# Patient Record
Sex: FEMALE | Race: WHITE | URBAN - METROPOLITAN AREA
[De-identification: names, ages, dates, MRNs, and addresses within clinical notes are randomized per-mention and may not be internally consistent; named-entity substitution may affect disease eponyms.]

---

## 2017-01-05 ENCOUNTER — HOSPITAL ENCOUNTER (OUTPATIENT)
Dept: LAB | Facility: CLINIC | Age: 32
Discharge: HOME | End: 2017-01-05
Attending: PSYCHIATRY & NEUROLOGY | Admitting: PSYCHIATRY & NEUROLOGY
Payer: COMMERCIAL

## 2017-01-05 DIAGNOSIS — G40.309 NONINTRACTABLE GENERALIZED IDIOPATHIC EPILEPSY WITHOUT STATUS EPILEPTICUS (H): ICD-10-CM

## 2017-01-05 PROCEDURE — 36415 COLL VENOUS BLD VENIPUNCTURE: CPT | Performed by: PSYCHIATRY & NEUROLOGY

## 2017-01-05 PROCEDURE — 80175 DRUG SCREEN QUAN LAMOTRIGINE: CPT | Performed by: PSYCHIATRY & NEUROLOGY

## 2017-01-07 LAB — LAMOTRIGINE SERPL-MCNC: 7.8 UG/ML

## 2017-02-02 ENCOUNTER — OFFICE VISIT (OUTPATIENT)
Dept: NEUROLOGY | Facility: CLINIC | Age: 32
End: 2017-02-02

## 2017-02-02 VITALS
HEART RATE: 76 BPM | SYSTOLIC BLOOD PRESSURE: 119 MMHG | WEIGHT: 180 LBS | DIASTOLIC BLOOD PRESSURE: 70 MMHG | BODY MASS INDEX: 30.73 KG/M2 | OXYGEN SATURATION: 97 % | HEIGHT: 64 IN | RESPIRATION RATE: 20 BRPM

## 2017-02-02 DIAGNOSIS — G40.309 NONINTRACTABLE GENERALIZED IDIOPATHIC EPILEPSY WITHOUT STATUS EPILEPTICUS (H): Primary | ICD-10-CM

## 2017-02-02 DIAGNOSIS — G40.309 NONINTRACTABLE GENERALIZED IDIOPATHIC EPILEPSY WITHOUT STATUS EPILEPTICUS (H): ICD-10-CM

## 2017-02-02 ASSESSMENT — PAIN SCALES - GENERAL: PAINLEVEL: NO PAIN (0)

## 2017-02-02 NOTE — Clinical Note
2017       RE: Cherelle Cummins  [address] [place] [address]     Dear Colleague,    Thank you for referring your patient, Cherelle Cummins, to the [facility] NEUROLOGY at [facility]. Please see a copy of my visit note below.    2017             [facility]    [address] [place] [address]      RE: Cherelle Cummins   MRN: [id number]   : 1985      Dear Dr. Reynoso:      Once again, we had the privilege of seeing Mrs. Cherelle Cummins, a 31-year-old preemie , who has an EDC on 02/10.  She comes today for a final visit prior to the delivery.      She has done very well in the last few months, having had no seizures.  We have been monitoring her levels of Lamictal, and they have been relatively stable, although the doses had to be progressively increased, so she is taking now 600 mg twice a day.      Back in November she had a low level at 3.8, and then we have been increasing this subsequently.  She has been on folic acid.  Her last EEG on  did show generalized spike and wave, which were occurring at a frequency reported as occurring occasionally during sleep with frontal predominance.      Her last level of lamotrigine was on 2017, and this was 7.8.  The previous ones have been around 8.      She comes in with her .  We reviewed issues, such as breast-feeding.  We recommended that probably it would be good to alternate breast with bottle, something she is not eager to do, but I think they accepted that.  She is wanting to more breast-feed the baby.      She will stay on the folic acid now.  They want her to stay on the Lamictal probably after delivery because they want to have other children.      We went through the history, and she has an aunt who had seizures young in life and then went 20 years without them, and the seizures did recur.  She has a sister who has  had some difficulty with seizures, but they have been stable after a medication change.  She is currently on Keppra, and she had no problem with delivery.  We talked about the care of the baby, precautions on how to handle the baby to reduce the potential of injury to the baby, and we have given information about this in the past as well.      PHYSICAL EXAMINATION:  Blood pressure is 119/70.  Pulse is 76.  She is looking good and not showing any difficulties or abnormalities.      We have ordered a blood level for her today, and she should get one, also, prior to delivery, and we do have an open order for her.      We will see her after the delivery in approximately 3 weeks.  The issue of vitamin K has come up, and I do not think that needs to be administered, but we will review the literature and advise further.      Thank you, and we will be available during delivery, and I gave her my personal cell phone to call.        Best regards,      Radu Reyna MD     D: 2017 17:11   T: 2017 10:32   MT: kanu      Name:     TIMO GONZALEZ   MRN:      [id number]        Account:      [id number]   :      1985           Service Date: 2017      Document: [id number]

## 2017-02-02 NOTE — MR AVS SNAPSHOT
After Visit Summary   2/2/2017    Cherelle Cummins    MRN: [id number]           Patient Information     Date Of Birth          1985        Visit Information        Provider Department      2/2/2017 4:30 PM Radu Reyna MD [facility] Neurology        Today's Diagnoses     Nonintractable generalized idiopathic epilepsy without status epilepticus (H)    -  1        Follow-ups after your visit        Follow-up notes from your care team     Return in about 3 weeks (around 2/23/2017).      Your next 10 appointments already scheduled     Feb 02, 2017  5:30 PM   LAB with  LAB   [facility] Lab ([facility])    [address] [place] [address] [phone]           Patient must bring picture ID.  Patient should be prepared to give a urine specimen  Please do not eat 10-12 hours before your appointment if you are coming in fasting for labs on lipids, cholesterol, or glucose (sugar).  Pregnant women should follow their Care Team instructions. Water with medications is okay. Do not drink coffee or other fluids.   If you have concerns about taking  your medications, please ask at office or if scheduling via [employer], send a message by clicking on Secure Messaging, Message Your Care Team.            Mar 02, 2017  3:30 PM   (Arrive by 3:15 PM)   Return Seizure with Radu Reyna MD   [facility] Neurology ([facility])    [address] [place] [address] [phone]              Future tests that were ordered for you today     Open Standing Orders        Priority Remaining Interval Expires Ordered    Lamotrigine level STAT 5/5  2/2/2018 2/2/2017            Who to contact     Please call your clinic at [phone] to:    Ask questions about your health    Make or cancel appointments    Discuss your medicines    Learn about your test results    Speak to your doctor   If you have compliments or concerns about  "an experience at your clinic, or if you wish to file a complaint, please contact [facility] Patient Relations at [phone] or email us at [email or web address]         Additional Information About Your Visit        [employer] Information     [employer] gives you secure access to your electronic health record. If you see a primary care provider, you can also send messages to your care team and make appointments. If you have questions, please call your primary care clinic.  If you do not have a primary care provider, please call [phone] and they will assist you.      [employer] is an electronic gateway that provides easy, online access to your medical records. With [employer], you can request a clinic appointment, read your test results, renew a prescription or communicate with your care team.     To access your existing account, please contact your [facility] or call [phone] for assistance.        Care EveryWhere ID     This is your Care EveryWhere ID. This could be used by other organizations to access your [place] medical records  [id number]        Your Vitals Were     Pulse Respirations Height BMI (Body Mass Index) Pulse Oximetry Last Period    76 20 1.626 m (5' 4\") 30.88 kg/m2 97% 04/10/2016 (Exact Date)    Breastfeeding?                   No            Blood Pressure from Last 3 Encounters:   02/02/17 119/70   06/24/16 110/72   04/29/16 110/76    Weight from Last 3 Encounters:   02/02/17 81.647 kg (180 lb)   12/19/16 76.567 kg (168 lb 12.8 oz)   06/24/16 71.26 kg (157 lb 1.6 oz)               Primary Care Provider Office Phone # Fax #    Bairon Herrera [phone]       [address] [facility] [address]  [place] [address]        Thank you!     Thank you for choosing [facility] NEUROLOGY  for your care. Our goal is always to provide you with excellent care. Hearing back from our patients is one way we can " continue to improve our services. Please take a few minutes to complete the written survey that you may receive in the mail after your visit with us. Thank you!             Your Updated Medication List - Protect others around you: Learn how to safely use, store and throw away your medicines at www.disposemymeds.org.          This list is accurate as of: 2/2/17  5:19 PM.  Always use your most recent med list.                   Brand Name Dispense Instructions for use    folic acid 1 MG tablet    FOLVITE    30 tablet    Take 1 tablet (1 mg) by mouth daily       lamoTRIgine 200 MG tablet    LaMICtal    180 tablet    3 tabs twice a day       PRENATAL AD PO      Take 1 tablet by mouth daily

## 2017-02-03 LAB — LAMOTRIGINE SERPL-MCNC: 8.1 UG/ML

## 2017-02-03 NOTE — PROGRESS NOTES
2017             MD Letha [facility] [place]    [address] [place] [address]      RE: Cherelle Cummins   MRN: [id number]   : 1985      Dear Dr. Reynoso:      Once again, we had the privilege of seeing Mrs. Cherelle Cummins, a 31-year-old preemie , who has an EDC on 02/10.  She comes today for a final visit prior to the delivery.      She has done very well in the last few months, having had no seizures.  We have been monitoring her levels of Lamictal, and they have been relatively stable, although the doses had to be progressively increased, so she is taking now 600 mg twice a day.      Back in November she had a low level at 3.8, and then we have been increasing this subsequently.  She has been on folic acid.  Her last EEG on  did show generalized spike and wave, which were occurring at a frequency reported as occurring occasionally during sleep with frontal predominance.      Her last level of lamotrigine was on 2017, and this was 7.8.  The previous ones have been around 8.      She comes in with her .  We reviewed issues, such as breast-feeding.  We recommended that probably it would be good to alternate breast with bottle, something she is not eager to do, but I think they accepted that.  She is wanting to more breast-feed the baby.      She will stay on the folic acid now.  They want her to stay on the Lamictal probably after delivery because they want to have other children.      We went through the history, and she has an aunt who had seizures young in life and then went 20 years without them, and the seizures did recur.  She has a sister who has had some difficulty with seizures, but they have been stable after a medication change.  She is currently on Keppra, and she had no problem with delivery.  We talked about the care of the baby, precautions on how to handle the baby to reduce the potential of injury to the  baby, and we have given information about this in the past as well.      PHYSICAL EXAMINATION:  Blood pressure is 119/70.  Pulse is 76.  She is looking good and not showing any difficulties or abnormalities.      We have ordered a blood level for her today, and she should get one, also, prior to delivery, and we do have an open order for her.      We will see her after the delivery in approximately 3 weeks.  The issue of vitamin K has come up, and I do not think that needs to be administered, but we will review the literature and advise further.      Thank you, and we will be available during delivery, and I gave her my personal cell phone to call.        Best regards,      MD CATRACHITO Morales MD             D: 2017 17:11   T: 2017 10:32   MT: kanu      Name:     TIMO GONZALEZ   MRN:      [id number]        Account:      [id number]   :      1985           Service Date: 2017      Document: [id number]

## 2017-03-02 ENCOUNTER — OFFICE VISIT (OUTPATIENT)
Dept: NEUROLOGY | Facility: CLINIC | Age: 32
End: 2017-03-02

## 2017-03-02 VITALS — DIASTOLIC BLOOD PRESSURE: 74 MMHG | HEART RATE: 82 BPM | SYSTOLIC BLOOD PRESSURE: 124 MMHG | HEIGHT: 64 IN

## 2017-03-02 DIAGNOSIS — G40.309 NONINTRACTABLE GENERALIZED IDIOPATHIC EPILEPSY WITHOUT STATUS EPILEPTICUS (H): ICD-10-CM

## 2017-03-02 DIAGNOSIS — G40.309 NONINTRACTABLE GENERALIZED IDIOPATHIC EPILEPSY WITHOUT STATUS EPILEPTICUS (H): Primary | ICD-10-CM

## 2017-03-02 ASSESSMENT — PAIN SCALES - GENERAL: PAINLEVEL: NO PAIN (0)

## 2017-03-02 NOTE — LETTER
3/2/2017       RE: Cherelle Cummins  [address] [place] [address]     Dear Colleague,    Thank you for referring your patient, Cherelle Cummins, to the [facility] NEUROLOGY at [facility]. Please see a copy of my visit note below.    2017      Bairon Herrera, PA   [facility] [place]    [address] [place] [address]      RE: Cherelle Cummins   MRN: [id number]   : 1985      Dear Dr. Herrera:      Ms. Cummins is 31-year-old and she is 2 weeks post-delivery of a normal female child which is doing very good and gaining weight.  She comes back for resetting of doses for the anticonvulsant Lamictal, which she has got throughout her pregnancy in increasing doses.  She started pregnancy at 300 mg per day and at the end of pregnancy she was at 600.  Her last concentration was reported to be 10.7 which was the day of the delivery.  Prior to that she has been around 8.      Probably her dose when she is not pregnant is 300-400.      She has experienced no seizures during the delivery or postop period and she is breast-feeding the child part of the feedings and the child is gaining weight, alert and having no side effects.      PHYSICAL EXAMINATION:     VITAL SIGNS:   All very normal.     NEUROLOGIC:   She shows only minimal nystagmus on lateral gaze.  She is alert, pleasant.  She lost some pound during the delivery, but apparently not unusual.      In summary, she is seizure free.  We will proceed to drop her dose of Lamictal once we have a current level, which would be 2 weeks after delivery which is about the right time.  Probably the upper to around 300-400 daily on a very slow fashion until we get there.  She is comfortable with that.  She is not sure if she wants to have any more children at this point.        We will see her in 3 months.      Sincerely,       Radu Reyna MD        D: 2017 15:59   T: 2017 07:47   MT:  [id number]      Name:     TIMO GONZALEZ   MRN:      [id number]        Account:      [id number]   :      1985           Service Date: 2017      Document: [id number]

## 2017-03-02 NOTE — MR AVS SNAPSHOT
After Visit Summary   3/2/2017    Cherelle Cummins    MRN: [id number]           Patient Information     Date Of Birth          1985        Visit Information        Provider Department      3/2/2017 3:30 PM Radu Reyna MD [facility] Neurology        Today's Diagnoses     Nonintractable generalized idiopathic epilepsy without status epilepticus (H)    -  1       Follow-ups after your visit        Follow-up notes from your care team     Return in about 3 months (around 6/2/2017).      Your next 10 appointments already scheduled     Mar 02, 2017  4:00 PM CST   LAB with  LAB   [facility] Lab ([facility])    [address] [place] [address] [phone]           Patient must bring picture ID.  Patient should be prepared to give a urine specimen  Please do not eat 10-12 hours before your appointment if you are coming in fasting for labs on lipids, cholesterol, or glucose (sugar).  Pregnant women should follow their Care Team instructions. Water with medications is okay. Do not drink coffee or other fluids.   If you have concerns about taking  your medications, please ask at office or if scheduling via [employer], send a message by clicking on Secure Messaging, Message Your Care Team.            Jun 13, 2017 10:30 AM CDT   (Arrive by 10:15 AM)   Return Seizure with Radu Reyna MD   [facility] Neurology ([facility])    [address] [place] [address] [phone]              Future tests that were ordered for you today     Open Future Orders        Priority Expected Expires Ordered    Lamotrigine level Routine  3/2/2018 3/2/2017            Who to contact     Please call your clinic at [phone] to:    Ask questions about your health    Make or cancel appointments    Discuss your medicines    Learn about your test results    Speak to your doctor   If you have compliments or concerns about an  "experience at your clinic, or if you wish to file a complaint, please contact [facility] Patient Relations at [phone] or email us at [email or web address]         Additional Information About Your Visit        [employer] Information     [employer] gives you secure access to your electronic health record. If you see a primary care provider, you can also send messages to your care team and make appointments. If you have questions, please call your primary care clinic.  If you do not have a primary care provider, please call [phone] and they will assist you.      [employer] is an electronic gateway that provides easy, online access to your medical records. With [employer], you can request a clinic appointment, read your test results, renew a prescription or communicate with your care team.     To access your existing account, please contact your [facility] or call [phone] for assistance.        Care EveryWhere ID     This is your Care EveryWhere ID. This could be used by other organizations to access your [place] medical records  [id number]        Your Vitals Were     Pulse Height Last Period             82 1.626 m (5' 4\") 04/10/2016 (Exact Date)          Blood Pressure from Last 3 Encounters:   03/02/17 124/74   02/02/17 119/70   06/24/16 110/72    Weight from Last 3 Encounters:   02/02/17 81.6 kg (180 lb)   12/19/16 76.6 kg (168 lb 12.8 oz)   06/24/16 71.3 kg (157 lb 1.6 oz)               Primary Care Provider Office Phone # Fax #    Bairon Herrera [phone]       [address] [facility] [address] [facility] [address]        Thank you!     Thank you for choosing [facility] NEUROLOGY  for your care. Our goal is always to provide you with excellent care. Hearing back from our patients is one way we can continue to improve our services. Please take a few minutes to complete the written survey that you may receive " in the mail after your visit with us. Thank you!             Your Updated Medication List - Protect others around you: Learn how to safely use, store and throw away your medicines at www.disposemymeds.org.          This list is accurate as of: 3/2/17  3:50 PM.  Always use your most recent med list.                   Brand Name Dispense Instructions for use    folic acid 1 MG tablet    FOLVITE    30 tablet    Take 1 tablet (1 mg) by mouth daily       lamoTRIgine 200 MG tablet    LaMICtal    180 tablet    3 tabs twice a day       PRENATAL AD PO      Take 1 tablet by mouth daily

## 2017-03-03 NOTE — PROGRESS NOTES
2017      REJI Soto   [facility] [place]    [address] [place] [address]      RE: Timo Cummins   MRN: [id number]   : 1985      Dear Dr. Herrera:      Ms. Cummins is 31-year-old and she is 2 weeks post-delivery of a normal female child which is doing very good and gaining weight.  She comes back for resetting of doses for the anticonvulsant Lamictal, which she has got throughout her pregnancy in increasing doses.  She started pregnancy at 300 mg per day and at the end of pregnancy she was at 600.  Her last concentration was reported to be 10.7 which was the day of the delivery.  Prior to that she has been around 8.      Probably her dose when she is not pregnant is 300-400.      She has experienced no seizures during the delivery or postop period and she is breast-feeding the child part of the feedings and the child is gaining weight, alert and having no side effects.      PHYSICAL EXAMINATION:     VITAL SIGNS:   All very normal.     NEUROLOGIC:   She shows only minimal nystagmus on lateral gaze.  She is alert, pleasant.  She lost some pound during the delivery, but apparently not unusual.      In summary, she is seizure free.  We will proceed to drop her dose of Lamictal once we have a current level, which would be 2 weeks after delivery which is about the right time.  Probably the upper to around 300-400 daily on a very slow fashion until we get there.  She is comfortable with that.  She is not sure if she wants to have any more children at this point.        We will see her in 3 months.      Sincerely,       MD CATRACHITO Morales MD             D: 2017 15:59   T: 2017 07:47   MT: ROSA      Name:     TIMO CUMMINS   MRN:      [id number]        Account:      [id number]   :      1985           Service Date: 2017      Document: [id number]

## 2017-03-04 LAB — LAMOTRIGINE SERPL-MCNC: 10.8 UG/ML

## 2017-03-27 ENCOUNTER — HOSPITAL ENCOUNTER (OUTPATIENT)
Dept: LAB | Facility: CLINIC | Age: 32
Discharge: HOME | End: 2017-03-27
Attending: PSYCHIATRY & NEUROLOGY | Admitting: PSYCHIATRY & NEUROLOGY
Payer: COMMERCIAL

## 2017-03-27 ENCOUNTER — TELEPHONE (OUTPATIENT)
Dept: NEUROLOGY | Facility: CLINIC | Age: 32
End: 2017-03-27

## 2017-03-27 DIAGNOSIS — G40.309 GENERALIZED IDIOPATHIC EPILEPSY, NOT INTRACTABLE, WITHOUT STATUS EPILEPTICUS (H): Primary | ICD-10-CM

## 2017-03-27 DIAGNOSIS — G40.309 GENERALIZED IDIOPATHIC EPILEPSY, NOT INTRACTABLE, WITHOUT STATUS EPILEPTICUS (H): ICD-10-CM

## 2017-03-27 PROCEDURE — 36415 COLL VENOUS BLD VENIPUNCTURE: CPT | Performed by: PSYCHIATRY & NEUROLOGY

## 2017-03-27 PROCEDURE — 80175 DRUG SCREEN QUAN LAMOTRIGINE: CPT | Performed by: PSYCHIATRY & NEUROLOGY

## 2017-03-27 NOTE — TELEPHONE ENCOUNTER
Writer received message:  Caller name:pt [phone]   Treating provider/specialty:Maribell       Nurse:     Best time to return call:     Message left?     Description of issue/question:   sx are dizzy, seeing dbl for a couple days - see below.   No longer pregnant and is   Taking LAMICTAL 200mg 2 tabs in am and 3 at noc.     March 21, 2017   Cherelle Cummins   to Radu Reyna MD           3:07 PM   Hey Dr. Reyna-   I was wondering if I should be going in to get a blood test to see if I can continue to decrease my meds.  If so I would prefer to use the [facility] in [place].  Let me know- I'd like to continue to decrease.  Thanks     Radha Cummins       Writer called patient back and left voice message requesting a call back x 2 and left a voice message the second time indicating that orders for lab draw would be sent to her clinic.  Will continue also to hear back from patient and confirm symptoms and assess need to involve MD.    Joao Cisneros

## 2017-03-29 LAB — LAMOTRIGINE SERPL-MCNC: 13.6 UG/ML

## 2017-03-31 NOTE — TELEPHONE ENCOUNTER
Again missed patient via phone, but send a [employer] message instructing her to decrease her PM dose of Lamictal to 400 ( Two tabs ).    Joaocésar Cisneros

## 2017-04-04 ENCOUNTER — TELEPHONE (OUTPATIENT)
Dept: NEUROLOGY | Facility: CLINIC | Age: 32
End: 2017-04-04

## 2017-04-04 NOTE — TELEPHONE ENCOUNTER
Received message from Dr. Reyna, who is in agreement with reducing lamictal dose to 400 / 400 and would like additional blood draw in one week.    Instructions relayed to patient who is completely agreeable and order placed.  Joao Cisneros

## 2017-04-17 ENCOUNTER — TELEPHONE (OUTPATIENT)
Dept: NEUROLOGY | Facility: CLINIC | Age: 32
End: 2017-04-17

## 2017-04-17 DIAGNOSIS — G40.309 GENERALIZED IDIOPATHIC EPILEPSY, NOT INTRACTABLE, WITHOUT STATUS EPILEPTICUS (H): Primary | ICD-10-CM

## 2017-04-17 NOTE — TELEPHONE ENCOUNTER
Writer has been looking for lab value following decrease of LAMOTRIGINE dose.  Patient was told to get blood level done after one week at reduced dose, but nothing has been coming through.  Made calls x 3 to patient over a period of a week without a response.  Writer called her spouse to check and see if patient was ok, spouse informed writer that patient is out of town and would make certain that patient called on the next day.    Joao Cisneros

## 2017-04-18 DIAGNOSIS — G40.309 IDIOPATHIC GENERALIZED EPILEPSY (H): Primary | ICD-10-CM

## 2017-04-18 NOTE — TELEPHONE ENCOUNTER
Patient returned call today, writer request she get blood draw again since she has been on a reduced dose.  Patient fully agreeable, will go to [facility]. Writer will send lab orders to clinic at fax # [phone].    Joao Cisneros

## 2017-07-21 ENCOUNTER — OFFICE VISIT (OUTPATIENT)
Dept: NEUROLOGY | Facility: CLINIC | Age: 32
End: 2017-07-21

## 2017-07-21 VITALS
WEIGHT: 157 LBS | BODY MASS INDEX: 26.8 KG/M2 | HEART RATE: 70 BPM | DIASTOLIC BLOOD PRESSURE: 78 MMHG | HEIGHT: 64 IN | SYSTOLIC BLOOD PRESSURE: 116 MMHG

## 2017-07-21 DIAGNOSIS — G40.909 SEIZURE DISORDER (H): ICD-10-CM

## 2017-07-21 DIAGNOSIS — G40.309 GENERALIZED IDIOPATHIC EPILEPSY, NOT INTRACTABLE, WITHOUT STATUS EPILEPTICUS (H): ICD-10-CM

## 2017-07-21 RX ORDER — LAMOTRIGINE 200 MG/1
TABLET ORAL
Qty: 520 TABLET | Refills: 3 | Status: SHIPPED | OUTPATIENT
Start: 2017-07-21 | End: 2018-07-25

## 2017-07-21 ASSESSMENT — PAIN SCALES - GENERAL: PAINLEVEL: NO PAIN (0)

## 2017-07-21 NOTE — MR AVS SNAPSHOT
After Visit Summary   7/21/2017    Cherelle Cummins    MRN: [id number]           Patient Information     Date Of Birth          1985        Visit Information        Provider Department      7/21/2017 8:00 AM Radu Reyna MD [facility] Neurology        Today's Diagnoses     Seizure disorder (H)        Generalized idiopathic epilepsy, not intractable, without status epilepticus (H)           Follow-ups after your visit        Follow-up notes from your care team     Return in about 3 months (around 10/21/2017).      Your next 10 appointments already scheduled     Jul 31, 2017 10:00 AM CDT   (Arrive by 9:45 AM)   [facility] Routine Visit with  EEG TECH 1   EEG [facility] OUTPATIENT ([facility])    [address] [place] [address] [phone]            Oct 20, 2017  8:00 AM CDT   (Arrive by 7:45 AM)   Return Seizure with Radu Reyna MD   [facility] Neurology ([facility])    [address] [place] [address] [phone]              Future tests that were ordered for you today     Open Future Orders        Priority Expected Expires Ordered    EEG ROUTINE ([id number]) Routine  8/18/2017 7/21/2017            Who to contact     Please call your clinic at [phone] to:    Ask questions about your health    Make or cancel appointments    Discuss your medicines    Learn about your test results    Speak to your doctor   If you have compliments or concerns about an experience at your clinic, or if you wish to file a complaint, please contact [facility] Physicians Patient Relations at [phone] or email us at [email or web address]         Additional Information About Your Visit        MyChart Information     Dangerhart gives you secure access to your electronic health record. If you see a primary care provider, you can also send messages to your care team and make appointments. If you have  "questions, please call your primary care clinic.  If you do not have a primary care provider, please call [phone] and they will assist you.      FoxyP2 is an electronic gateway that provides easy, online access to your medical records. With FoxyP2, you can request a clinic appointment, read your test results, renew a prescription or communicate with your care team.     To access your existing account, please contact your [facility] or call [phone] for assistance.        Care EveryWhere ID     This is your Care EveryWhere ID. This could be used by other organizations to access your [place] medical records  [id number]        Your Vitals Were     Pulse Height BMI (Body Mass Index)             70 1.626 m (5' 4\") 26.95 kg/m2          Blood Pressure from Last 3 Encounters:   07/21/17 116/78   03/02/17 124/74   02/02/17 119/70    Weight from Last 3 Encounters:   07/21/17 71.2 kg (157 lb)   02/02/17 81.6 kg (180 lb)   12/19/16 76.6 kg (168 lb 12.8 oz)              We Performed the Following     Lamotrigine Level          Today's Medication Changes          These changes are accurate as of: 7/21/17  8:39 AM.  If you have any questions, ask your nurse or doctor.               These medicines have changed or have updated prescriptions.        Dose/Directions    lamoTRIgine 200 MG tablet   Commonly known as:  LaMICtal   This may have changed:  additional instructions   Used for:  Seizure disorder (H), Generalized idiopathic epilepsy, not intractable, without status epilepticus (H)   Changed by:  Radu Reyna MD        2 tabs in am and 1 pm   Quantity:  520 tablet   Refills:  3            Where to get your medicines      These medications were sent to [facility] - [facility], [place] - [address]  [address] [address]     Phone:  [phone]     lamoTRIgine 200 MG tablet                Primary Care Provider Office Phone # Fax #    " Bairon Herrera [phone]       [address] [place] [address] [place] [address]        Equal Access to Services     KATHI ORTEGA : Kiki guo finn Sojosé miguelali, waaxda luqadaha, qaybta kaalmada adeegyada, maldonado eden ponchotamir metcalf deja lawler. So [facility] [phone].    ATENCIÓN: Si habla español, tiene a feldman disposición servicios gratuitos de asistencia lingüística. Jenise al [phone].    We comply with applicable federal civil rights laws and [place] laws. We do not discriminate on the basis of race, color, national origin, age, disability sex, sexual orientation or gender identity.            Thank you!     Thank you for choosing [facility] NEUROLOGY  for your care. Our goal is always to provide you with excellent care. Hearing back from our patients is one way we can continue to improve our services. Please take a few minutes to complete the written survey that you may receive in the mail after your visit with us. Thank you!             Your Updated Medication List - Protect others around you: Learn how to safely use, store and throw away your medicines at www.disposemymeds.org.          This list is accurate as of: 7/21/17  8:39 AM.  Always use your most recent med list.                   Brand Name Dispense Instructions for use Diagnosis    folic acid 1 MG tablet    FOLVITE    30 tablet    Take 1 tablet (1 mg) by mouth daily    Generalized idiopathic epilepsy, not intractable, without status epilepticus (H), Seizure disorder (H)       lamoTRIgine 200 MG tablet    LaMICtal    520 tablet    2 tabs in am and 1 pm    Seizure disorder (H), Generalized idiopathic epilepsy, not intractable, without status epilepticus (H)       PRENATAL AD PO      Take 1 tablet by mouth daily    Generalized idiopathic epilepsy, not intractable, without status epilepticus (H), Seizure disorder (H)

## 2017-07-21 NOTE — LETTER
7/21/2017       RE: Cherelle Cummins  [address] [place] [address]     Dear Colleague,    Thank you for referring your patient, Cherelle Cummins, to the [facility] NEUROLOGY at [facility]. Please see a copy of my visit note below.    Visit note on Cherelle Cummins medical record #[id number]     This is Radha Cummins a 31 years old and has primary generalized epilepsy and has not had seizures for severa years now. She returns for a post delivery visit with the child which is rafy and they're healthy. The child born on February.    Throughout her pregnancy she had escalating doses of lamotrigine with the final dose prior to delivery at 1200 mg per day. Subsequent to that she is reduced  gradual as instructed she is nontender 600 mg per day. At the time of the 800 level was 13.6.     She did not have any seizures during the time of delivery after delivery or even during her pregnancy. She is taking folic acid a day. She takes lamotrigine 400 in the morning and 200 in the evening    The baby has been breast-fed almost since onset she was born and the pediatrician did liver studies of the baby and there were normal findings and also a low level Lamictal on the baby was possibly around 3 according to  Cherelle but she did not send a report.    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    She Is not started birth control pills. It is Important That We Talk about Interaction with bc pills..    Neuro exam-- She looks Very Good. No Signs of Toxicity.   Her Vital Signs Are Normal.    In Summary She Is down to the Dose Prior to Pregnancy of 600 Mg per Day of the Lamictal and we Need to Recheck Her Concentration    She Is Going to Take Birth Control Pills and a GYN Will  Be Communicating with Her in the regarding the Changesthat May Occur with the Pill and Lamictal and the Possible Interaction.   Insofar As the Baby Is Her Choice Whether to Gradually Stop the Breast-Feeding to get the Medication of the babys systems.. I Favor that but Obviously Now It Is No Harm to the Child Especially with Normal Liver Function Tests Reported to Me.     We'll Check a Concentration Today Lamictal on Her. She Needs an EEG as she Has Not Had One since December of Last Year and Will See Her Again in 3 Months.  She Needs to Communicate Better with Me through My Chart As I Was Unaware of Some of This Issues on Her Visit Today.   I Emphasized she Need to Do That   Thank You   Maribell

## 2017-07-22 LAB — LAMOTRIGINE SERPL-MCNC: 12.3 UG/ML

## 2017-08-03 ENCOUNTER — ALLIED HEALTH/NURSE VISIT (OUTPATIENT)
Dept: NEUROLOGY | Facility: CLINIC | Age: 32
End: 2017-08-03
Attending: PSYCHIATRY & NEUROLOGY

## 2017-08-03 DIAGNOSIS — G40.309 GENERALIZED IDIOPATHIC EPILEPSY, NOT INTRACTABLE, WITHOUT STATUS EPILEPTICUS (H): ICD-10-CM

## 2017-08-03 DIAGNOSIS — G40.909 SEIZURE DISORDER (H): ICD-10-CM

## 2017-08-03 NOTE — MR AVS SNAPSHOT
After Visit Summary   8/3/2017    Cherelle Cummins    MRN: [id number]           Patient Information     Date Of Birth          1985        Visit Information        Provider Department      8/3/2017 10:00 AM  EEG TECH 1 EEG [facility] OUTPATIENT        Today's Diagnoses     Seizure disorder (H)        Generalized idiopathic epilepsy, not intractable, without status epilepticus (H)           Follow-ups after your visit        Your next 10 appointments already scheduled     Oct 20, 2017  8:00 AM CDT   (Arrive by 7:45 AM)   Return Seizure with Radu Reyna MD   [facility] Neurology ([facility] [place])    [address] [place] [address] [phone]              Who to contact     Please call your clinic at [phone] to:    Ask questions about your health    Make or cancel appointments    Discuss your medicines    Learn about your test results    Speak to your doctor   If you have compliments or concerns about an experience at your clinic, or if you wish to file a complaint, please contact [facility] Physicians Patient Relations at [phone] or email us at [email or web address]         Additional Information About Your Visit        MyChart Information     BIME Analytic[employer] gives you secure access to your electronic health record. If you see a primary care provider, you can also send messages to your care team and make appointments. If you have questions, please call your primary care clinic.  If you do not have a primary care provider, please call [phone] and they will assist you.      Mamaya is an electronic gateway that provides easy, online access to your medical records. With Mamaya, you can request a clinic appointment, read your test results, renew a prescription or communicate with your care team.     To access your existing account, please contact your [facility] Physicians Clinic or call [phone] for  assistance.        Care EveryWhere ID     This is your Care EveryWhere ID. This could be used by other organizations to access your [place] medical records  [id number]         Blood Pressure from Last 3 Encounters:   07/21/17 116/78   03/02/17 124/74   02/02/17 119/70    Weight from Last 3 Encounters:   07/21/17 157 lb (71.2 kg)   02/02/17 180 lb (81.6 kg)   12/19/16 168 lb 12.8 oz (76.6 kg)              We Performed the Following     EEG ROUTINE ([id number])     EEG        Primary Care Provider Office Phone # Fax #    Bairon Herrera [phone]       [address] [address] [facility] [address]        Equal Access to Services     KATHI ORTEGA : Hadii marisabel ku hadasho Soomaali, waaxda luqadaha, qaybta kaalmada adeegyada, waxsydni bryan haykathi short . So [facility] [phone].    ATENCIÓN: Si habla español, tiene a feldman disposición servicios gratuitos de asistencia lingüística. Ll[facility] [phone].    We comply with applicable federal civil rights laws and [place] laws. We do not discriminate on the basis of race, color, national origin, age, disability sex, sexual orientation or gender identity.            Thank you!     Thank you for choosing [facility] OUTPATIENT  for your care. Our goal is always to provide you with excellent care. Hearing back from our patients is one way we can continue to improve our services. Please take a few minutes to complete the written survey that you may receive in the mail after your visit with us. Thank you!             Your Updated Medication List - Protect others around you: Learn how to safely use, store and throw away your medicines at www.disposemymeds.org.          This list is accurate as of: 8/3/17 11:59 PM.  Always use your most recent med list.                   Brand Name Dispense Instructions for use Diagnosis    folic acid 1 MG tablet    FOLVITE    30 tablet    Take 1 tablet (1 mg) by mouth daily    Generalized idiopathic  epilepsy, not intractable, without status epilepticus (H), Seizure disorder (H)       lamoTRIgine 200 MG tablet    LaMICtal    520 tablet    2 tabs in am and 1 pm    Seizure disorder (H), Generalized idiopathic epilepsy, not intractable, without status epilepticus (H)       PRENATAL AD PO      Take 1 tablet by mouth daily    Generalized idiopathic epilepsy, not intractable, without status epilepticus (H), Seizure disorder (H)

## 2017-08-06 NOTE — PROCEDURES
EEG#:          DATE OF RECORDIN2017       DURATION OF RECORDIN minutes.      CLINICAL HISTORY:  Cherelle Cummins is a 31-year-old female with a history of primary generalized epilepsy.  EEG was requested for followup study.      CURRENT MEDICATIONS:  Lamictal, vitamins.      TECHNICAL SUMMARY: This continuous video- EEG monitoring procedure was performed with 23 scalp electrodes in 10-20 electrode system placements, and additional scalp, precordial and other surface electrodes used for electrical referencing and artifact detection.  Video monitoring was utilized and periodically reviewed by EEG technologists and the physician for electroclinical correlations.     BACKGROUND ACTIVITIES:  The background activities of this EEG was symmetric and consisted of 8-1/2 Hz posterior dominant rhythm, which attenuates with eye opening during maximal wakefulness.  Hyperventilation produced generalized slowing of the background activities.  Photic stimulation produced no driving responses.      Sleep stages were recorded with well-formed vertex sharp transients and sleep spindles.      Occasional generalized spike and wave activities were observed during stage II sleep.      ICTAL ACTIVITIES:  None.      IMPRESSION:  This is an abnormal EEG due to the presence of occasional generalized spike and wave activities during sleep.  These findings are consistent with patient's history of primary generalized epilepsy.         ESTELITA HERNANDEZ MD             D: 2017 16:18   T: 2017 16:29   MT: YESICA      Name:     CHERELLE CUMMINS   MRN:      [id number]        Account:        [id number]   :      1985           Procedure Date: 2017      Document: [id number]

## 2017-08-19 ENCOUNTER — HEALTH MAINTENANCE LETTER (OUTPATIENT)
Age: 32
End: 2017-08-19

## 2018-07-25 DIAGNOSIS — G40.909 SEIZURE DISORDER (H): ICD-10-CM

## 2018-07-25 DIAGNOSIS — G40.309 GENERALIZED IDIOPATHIC EPILEPSY, NOT INTRACTABLE, WITHOUT STATUS EPILEPTICUS (H): ICD-10-CM

## 2018-07-26 RX ORDER — LAMOTRIGINE 200 MG/1
TABLET ORAL
Qty: 90 TABLET | Refills: 0 | Status: SHIPPED | OUTPATIENT
Start: 2018-07-26 | End: 2018-07-31

## 2018-07-31 DIAGNOSIS — G40.309 GENERALIZED IDIOPATHIC EPILEPSY, NOT INTRACTABLE, WITHOUT STATUS EPILEPTICUS (H): ICD-10-CM

## 2018-07-31 DIAGNOSIS — G40.909 SEIZURE DISORDER (H): ICD-10-CM

## 2018-08-01 RX ORDER — LAMOTRIGINE 200 MG/1
TABLET ORAL
Qty: 270 TABLET | Refills: 1 | Status: SHIPPED | OUTPATIENT
Start: 2018-08-01

## 2018-08-01 NOTE — TELEPHONE ENCOUNTER
Patient request refill of Lamotrigine and she would like a lab draw as well, as she is pregnant.  Refill sent and order for Lamotrigine level placed in Epic.  Call placed to patient to inform.  Patient confirms she is pregnant and nurse indicated he would speak to Dr. Reyna about a standing order for Lamotrigine.

## 2018-08-02 ENCOUNTER — TELEPHONE (OUTPATIENT)
Dept: NEUROLOGY | Facility: CLINIC | Age: 33
End: 2018-08-02

## 2018-08-02 DIAGNOSIS — G40.309 GENERALIZED IDIOPATHIC EPILEPSY, NOT INTRACTABLE, WITHOUT STATUS EPILEPTICUS (H): Primary | ICD-10-CM

## 2018-08-02 NOTE — TELEPHONE ENCOUNTER
Nurse spoke with patient yesterday and patient confirms she is pregnant.  Spoke with Dr. Reyna about a standing order for Lamotrigine levels and Dr. Reyna is in agreement.  Will enter order for Lamotrigine levels for approximately every two week for the first 12 weeks, once a month x 6 months.

## 2018-08-27 DIAGNOSIS — G40.309 GENERALIZED IDIOPATHIC EPILEPSY, NOT INTRACTABLE, WITHOUT STATUS EPILEPTICUS (H): ICD-10-CM

## 2018-08-27 DIAGNOSIS — G40.909 SEIZURE DISORDER (H): ICD-10-CM

## 2018-08-28 RX ORDER — LAMOTRIGINE 200 MG/1
TABLET ORAL
Qty: 90 TABLET | Refills: 0 | OUTPATIENT
Start: 2018-08-28

## 2018-10-30 ENCOUNTER — HOSPITAL ENCOUNTER (OUTPATIENT)
Dept: LAB | Facility: CLINIC | Age: 33
Discharge: HOME | End: 2018-10-30
Attending: PSYCHIATRY & NEUROLOGY | Admitting: PSYCHIATRY & NEUROLOGY
Payer: COMMERCIAL

## 2018-10-30 DIAGNOSIS — G40.309 GENERALIZED IDIOPATHIC EPILEPSY, NOT INTRACTABLE, WITHOUT STATUS EPILEPTICUS (H): ICD-10-CM

## 2018-10-30 PROCEDURE — 36415 COLL VENOUS BLD VENIPUNCTURE: CPT | Performed by: PSYCHIATRY & NEUROLOGY

## 2018-10-30 PROCEDURE — 80175 DRUG SCREEN QUAN LAMOTRIGINE: CPT | Performed by: PSYCHIATRY & NEUROLOGY

## 2018-10-31 DIAGNOSIS — G40.309: ICD-10-CM

## 2018-10-31 DIAGNOSIS — Z79.899 NEED FOR PROPHYLACTIC CHEMOTHERAPY: ICD-10-CM

## 2018-10-31 DIAGNOSIS — Z34.90 SUPERVISION OF NORMAL PREGNANCY: ICD-10-CM

## 2018-10-31 LAB — LAMOTRIGINE SERPL-MCNC: 2.5 UG/ML (ref 2.5–15)

## 2018-11-14 ENCOUNTER — MEDICAL CORRESPONDENCE (OUTPATIENT)
Dept: HEALTH INFORMATION MANAGEMENT | Facility: CLINIC | Age: 33
End: 2018-11-14

## 2018-11-30 ENCOUNTER — HOSPITAL ENCOUNTER (OUTPATIENT)
Dept: LAB | Facility: CLINIC | Age: 33
Discharge: HOME | End: 2018-11-30
Attending: PSYCHIATRY & NEUROLOGY | Admitting: PSYCHIATRY & NEUROLOGY
Payer: COMMERCIAL

## 2018-11-30 DIAGNOSIS — G40.309 GENERALIZED IDIOPATHIC EPILEPSY, NOT INTRACTABLE, WITHOUT STATUS EPILEPTICUS (H): ICD-10-CM

## 2018-11-30 PROCEDURE — 36415 COLL VENOUS BLD VENIPUNCTURE: CPT | Performed by: PSYCHIATRY & NEUROLOGY

## 2018-11-30 PROCEDURE — 80175 DRUG SCREEN QUAN LAMOTRIGINE: CPT | Performed by: PSYCHIATRY & NEUROLOGY

## 2018-12-01 LAB — LAMOTRIGINE SERPL-MCNC: 3.9 UG/ML (ref 2.5–15)

## 2018-12-03 ENCOUNTER — TELEPHONE (OUTPATIENT)
Dept: NEUROLOGY | Facility: CLINIC | Age: 33
End: 2018-12-03

## 2018-12-13 ENCOUNTER — TELEPHONE (OUTPATIENT)
Dept: NEUROLOGY | Facility: CLINIC | Age: 33
End: 2018-12-13

## 2018-12-20 ENCOUNTER — HOSPITAL ENCOUNTER (OUTPATIENT)
Dept: LAB | Facility: CLINIC | Age: 33
Discharge: HOME | End: 2018-12-20
Attending: PSYCHIATRY & NEUROLOGY | Admitting: PSYCHIATRY & NEUROLOGY
Payer: COMMERCIAL

## 2018-12-20 DIAGNOSIS — Z34.90 SUPERVISION OF NORMAL PREGNANCY: ICD-10-CM

## 2018-12-20 DIAGNOSIS — G40.309: ICD-10-CM

## 2018-12-20 DIAGNOSIS — Z79.899 NEED FOR PROPHYLACTIC CHEMOTHERAPY: ICD-10-CM

## 2018-12-20 PROCEDURE — 36415 COLL VENOUS BLD VENIPUNCTURE: CPT | Performed by: PSYCHIATRY & NEUROLOGY

## 2018-12-20 PROCEDURE — 80175 DRUG SCREEN QUAN LAMOTRIGINE: CPT | Performed by: PSYCHIATRY & NEUROLOGY

## 2018-12-21 LAB — LAMOTRIGINE SERPL-MCNC: 5.5 UG/ML (ref 2.5–15)

## 2019-01-23 ENCOUNTER — HOSPITAL ENCOUNTER (OUTPATIENT)
Dept: LAB | Facility: CLINIC | Age: 34
Discharge: HOME | End: 2019-01-23
Attending: PEDIATRICS | Admitting: PSYCHIATRY & NEUROLOGY
Payer: COMMERCIAL

## 2019-01-23 DIAGNOSIS — Z34.90 SUPERVISION OF NORMAL PREGNANCY: ICD-10-CM

## 2019-01-23 DIAGNOSIS — Z79.899 NEED FOR PROPHYLACTIC CHEMOTHERAPY: ICD-10-CM

## 2019-01-23 DIAGNOSIS — G40.309: ICD-10-CM

## 2019-01-23 PROCEDURE — 36415 COLL VENOUS BLD VENIPUNCTURE: CPT | Performed by: PSYCHIATRY & NEUROLOGY

## 2019-01-23 PROCEDURE — 80175 DRUG SCREEN QUAN LAMOTRIGINE: CPT | Performed by: PSYCHIATRY & NEUROLOGY

## 2019-01-24 LAB — LAMOTRIGINE SERPL-MCNC: 15.4 UG/ML (ref 2.5–15)

## 2019-11-06 ENCOUNTER — HEALTH MAINTENANCE LETTER (OUTPATIENT)
Age: 34
End: 2019-11-06

## 2020-11-29 ENCOUNTER — HEALTH MAINTENANCE LETTER (OUTPATIENT)
Age: 35
End: 2020-11-29

## 2021-04-05 ENCOUNTER — HOSPITAL ENCOUNTER (EMERGENCY)
Facility: CLINIC | Age: 36
Discharge: HOME | End: 2021-04-05
Attending: PHYSICIAN ASSISTANT | Admitting: PHYSICIAN ASSISTANT
Payer: COMMERCIAL

## 2021-04-05 VITALS
OXYGEN SATURATION: 97 % | SYSTOLIC BLOOD PRESSURE: 115 MMHG | WEIGHT: 150 LBS | RESPIRATION RATE: 16 BRPM | BODY MASS INDEX: 25.61 KG/M2 | TEMPERATURE: 98.5 F | HEART RATE: 76 BPM | DIASTOLIC BLOOD PRESSURE: 75 MMHG | HEIGHT: 64 IN

## 2021-04-05 DIAGNOSIS — G40.909 NONINTRACTABLE EPILEPSY WITHOUT STATUS EPILEPTICUS, UNSPECIFIED EPILEPSY TYPE (H): ICD-10-CM

## 2021-04-05 DIAGNOSIS — G40.919 BREAKTHROUGH SEIZURE (H): ICD-10-CM

## 2021-04-05 LAB
ANION GAP SERPL CALCULATED.3IONS-SCNC: 4 MMOL/L (ref 3–14)
BUN SERPL-MCNC: 10 MG/DL (ref 7–30)
CALCIUM SERPL-MCNC: 8.3 MG/DL (ref 8.5–10.1)
CHLORIDE SERPL-SCNC: 104 MMOL/L (ref 94–109)
CO2 SERPL-SCNC: 26 MMOL/L (ref 20–32)
CREAT SERPL-MCNC: 0.68 MG/DL (ref 0.52–1.04)
GFR SERPL CREATININE-BSD FRML MDRD: >90 ML/MIN/{1.73_M2}
GLUCOSE SERPL-MCNC: 110 MG/DL (ref 70–99)
HCG SERPL QL: NEGATIVE
MAGNESIUM SERPL-MCNC: 2 MG/DL (ref 1.6–2.3)
POTASSIUM SERPL-SCNC: 4.2 MMOL/L (ref 3.4–5.3)
SODIUM SERPL-SCNC: 134 MMOL/L (ref 133–144)

## 2021-04-05 PROCEDURE — 250N000009 HC RX 250: Performed by: PHYSICIAN ASSISTANT

## 2021-04-05 PROCEDURE — 96361 HYDRATE IV INFUSION ADD-ON: CPT

## 2021-04-05 PROCEDURE — 258N000003 HC RX IP 258 OP 636: Performed by: PHYSICIAN ASSISTANT

## 2021-04-05 PROCEDURE — 99284 EMERGENCY DEPT VISIT MOD MDM: CPT | Mod: 25

## 2021-04-05 PROCEDURE — 96374 THER/PROPH/DIAG INJ IV PUSH: CPT

## 2021-04-05 PROCEDURE — 83735 ASSAY OF MAGNESIUM: CPT | Performed by: EMERGENCY MEDICINE

## 2021-04-05 PROCEDURE — 250N000011 HC RX IP 250 OP 636: Performed by: PHYSICIAN ASSISTANT

## 2021-04-05 PROCEDURE — 84703 CHORIONIC GONADOTROPIN ASSAY: CPT | Performed by: EMERGENCY MEDICINE

## 2021-04-05 PROCEDURE — 80048 BASIC METABOLIC PNL TOTAL CA: CPT | Performed by: EMERGENCY MEDICINE

## 2021-04-05 PROCEDURE — 80175 DRUG SCREEN QUAN LAMOTRIGINE: CPT | Performed by: EMERGENCY MEDICINE

## 2021-04-05 PROCEDURE — 250N000013 HC RX MED GY IP 250 OP 250 PS 637: Performed by: PHYSICIAN ASSISTANT

## 2021-04-05 RX ORDER — ONDANSETRON 2 MG/ML
4 INJECTION INTRAMUSCULAR; INTRAVENOUS ONCE
Status: COMPLETED | OUTPATIENT
Start: 2021-04-05 | End: 2021-04-05

## 2021-04-05 RX ADMIN — SODIUM CHLORIDE 1000 ML: 9 INJECTION, SOLUTION INTRAVENOUS at 14:41

## 2021-04-05 RX ADMIN — ONDANSETRON 4 MG: 2 INJECTION INTRAMUSCULAR; INTRAVENOUS at 14:42

## 2021-04-05 RX ADMIN — LIDOCAINE HYDROCHLORIDE 30 ML: 20 SOLUTION ORAL; TOPICAL at 14:41

## 2021-04-05 ASSESSMENT — MIFFLIN-ST. JEOR: SCORE: 1355.4

## 2021-04-05 ASSESSMENT — ENCOUNTER SYMPTOMS: SEIZURES: 1

## 2021-04-05 NOTE — ED PROVIDER NOTES
History   Chief Complaint:  Seizure     HPI   Cherelle Cummins is a 35 year old female with history of epilepsy currently on Lamictal who presents for evaluation of a seizure. This morning around 0830 the patient was resting on a couch when she had a seizure that was witnessed by her . He reports that he was in another room when the seizure began but went to check on the patient when he started hearing her making strange noises and when he found her she was shaking with her eyes rolled back and had some foam around her lips. He reports that this episode lasted for approximately 2-3 minutes and following this she seemed tired and confused. Due to concern for this seizure the patient came into the ED for evaluation. She reports that her last seizure was approximately four years ago, and she believes that her seizure today was caused by drinking more wine than she usually has last night. She reports that she only drinks alcohol occasionally but her last seizure four years ago occurred under similar circumstances. She has not had any changes to her Lamictal dose and has been taking this as prescribed. She denies any injuries and did not bite her tongue today. She does not believe that she is pregnant.     Review of Systems   Neurological: Positive for seizures.   All other systems reviewed and are negative.    Allergies:  Amoxicillin   Neosporin  Bacitracin-Neomycin-Polymyxin     Medications:  Folic acid  Lamictal  Prenatal     Past Medical History:    Epilepsy   Seizure disorder      Past Surgical History:    Tonsillectomy  [place] teeth extraction      Social History:  Marital status:   The patient presents to the ED accompanied by her .   Alcohol use: Occasional     Physical Exam     Patient Vitals for the past 24 hrs:   BP Temp Temp src Pulse Resp SpO2 Height Weight   04/05/21 1530 115/75 -- -- 76 16 97 % -- --   04/05/21 1500 114/73 98.5  F (36.9  C) Oral 87 16 98 % -- --   04/05/21 1445 --  "-- -- -- -- 99 % -- --   04/05/21 1430 119/83 -- -- 78 18 98 % -- --   04/05/21 1407 124/74 98.6  F (37  C) Oral 75 18 100 % -- --   04/05/21 1400 124/74 -- -- 87 18 97 % -- --   04/05/21 1200 121/70 98.4  F (36.9  C) Oral 81 19 100 % 1.626 m (5' 4\") 68 kg (150 lb)       Physical Exam  General: Alert and interactive. Appears well. Cooperative and pleasant.   Eyes: The pupils are equal and round. EOMs intact. No scleral icterus.  ENT: No abnormalities to the external nose or ears. Mucous membranes moist.Small bite yvette to inner cheek, left.  Posterior oropharynx is non-erythematous.    Neck: Trachea is in the midline. No nuchal rigidity.     CV: Regular rate and rhythm. S1 and S2 normal without murmur, click, gallop or rub.   Resp: Breath sounds are clear bilaterally, without rhonchi, wheezes, rales. Non-labored, no retractions or accessory muscle use.     GI: Abdomen is soft without distension. No tenderness to palpation. No peritoneal signs.    MS: Moving all extremities well. Good muscle tone.   Skin: Warm and dry. No rash or lesions noted.  Neuro: Alert and oriented x 3. No focal neurologic deficits. Good strength and sensation in upper and lower extremities. Psych: Awake. Alert.  Normal affect. Appropriate interactions.  Lymph: No anterior or posterior cervical lymphadenopathy noted.    Emergency Department Course     Laboratory:  BMP: Glucose 110 high, Calcium 8.3 low, o/w WNL (Creatinine 0.68)   Magnesium: 2.0   Lamotrigine level; Pending  HCG Qualitative Serum: Negative      Emergency Department Course:  Reviewed:  I reviewed nursing notes, vitals and past medical history     Assessments:  1425: I obtained history and examined the patient as noted above.     1535: I updated and reassessed the patient.     Interventions:  1441 NS 1,000 mL IV   1441 GI cocktail 30 mL PO   1442 Zofran 4 mg IV      Disposition:  The patient was discharged to home.      Impression & Plan     Medical Decision Making:  Cherelle BALDWIN" Placido is a 35 year old female with a PMH significant for a seizure disorder who presents for evaluation of a spell consistent with a seizure. A broad differential diagnosis was considered for the seizure, today including medicine non-compliance, low or high levels of anti-epileptic, intracranial bleed, stroke, tumor, hypoglycemia, cerebral edema, metabolic derangement, cardiac arrhythmia, and others. She has known epilepsy and has a history of a breakthrough seizure after heavy drinking and dehydration. The patient has no signs of concerning etiologies of seizure or seizure-mimics at this time. Anti-epileptic levels were sent and they will discuss the level with the neurologist (already scheduled appointment in two weeks). Labs are reassuring. The head to toe trauma exam is negative. Supportive outpatient management is indicated. Will have her refrain from driving and swimming until cleared by neurologist. She already gave herself a loading dose of Lamictal. Return for recurrent seizures, fevers, other complications.      Diagnosis:    ICD-10-CM    1. Breakthrough seizure (H)  G40.919    2. Nonintractable epilepsy without status epilepticus, unspecified epilepsy type (H)  G40.909       Scribe Disclosure:  I, Omari Doan, am serving as a scribe at 2:25 PM on 4/5/2021 to document services personally performed by Matilde Agee PA-C, based on my observations and the provider's statements to me.        Matilde Agee PA-C  04/05/21 1905

## 2021-04-05 NOTE — ED TRIAGE NOTES
Seizure @ 830 witness 2-3 min Hx of epilepsy; reports possible drank to much yesterday; last seizure was 4 years ago; on Lamictal

## 2021-04-06 ENCOUNTER — TELEPHONE (OUTPATIENT)
Dept: EMERGENCY MEDICINE | Facility: CLINIC | Age: 36
End: 2021-04-06

## 2021-04-06 LAB — LAMOTRIGINE SERPL-MCNC: 18.7 UG/ML (ref 2.5–15)

## 2021-04-06 NOTE — TELEPHONE ENCOUNTER
[facility] Emergency Department Lab result notification:    Reason for call  Lamictal Level  Lab Result  Final Lamotrigine (Lamictal) level is 18.7 ug/mL and this level is [ELEVATED]. Normal reference ranged for Lamotrigine (Lamictal) is 2.5 to 15.0 ug/mL  Resulted after [facility] Emergency Dept visit on 4/5/21 (date).  Patient to be notified of result and advised to relay result to their Neurologist or physician who manages the medication dosing.  ED visit Date: 4/5/21  Symptoms reported at ED visit Seizure     HPI   Cherelle Cummins is a 35 year old female with history of epilepsy currently on Lamictal who presents for evaluation of a seizure. This morning around 0830 the patient was resting on a couch when she had a seizure that was witnessed by her . He reports that he was in another room when the seizure began but went to check on the patient when he started hearing her making strange noises and when he found her she was shaking with her eyes rolled back and had some foam around her lips. He reports that this episode lasted for approximately 2-3 minutes and following this she seemed tired and confused. Due to concern for this seizure the patient came into the ED for evaluation. She reports that her last seizure was approximately four years ago, and she believes that her seizure today was caused by drinking more wine than she usually has last night. She reports that she only drinks alcohol occasionally but her last seizure four years ago occurred under similar circumstances. She has not had any changes to her Lamictal dose and has been taking this as prescribed. She denies any injuries and did not bite her tongue today. She does not believe that she is pregnant.    Miscellaneous information      Current symptoms  5:25 Left voicemail message requesting a call back to [facility] ED Lab Result RN at [phone].  RN is available every day between 10 a.m. and 6:30  p.m.  [RN Name]  Korin Mata  [facility]  Emergency Dept Lab Result RN  Ph# [phone]

## 2021-04-07 NOTE — TELEPHONE ENCOUNTER
[facility] Emergency Department/Urgent Care Lab result notification     Patient/parent Name  Radha    RN Assessment (Patient s current Symptoms), include time called.  [Insert Left message here if message left]  3:13PM: Patient returned call.   Lab result (if applicable):  Final Lamotrigine (Lamictal) level is 18.7 ug/mL and this level is [ELEVATED]. Normal reference ranged for Lamotrigine (Lamictal) is 2.5 to 15.0 ug/mL  Resulted after [facility] Emergency Dept visit on 4/5/21 (date).  Patient to be notified of result and advised to relay result to their Neurologist or physician who manages the medication dosing.  RN Recommendations/Instructions per [facility] ED lab result protocol  Patient notified of lab result. Advised to give the result to her neurologist immediately.  The patient is comfortable with the information given and states she will call her neurologist now. She has no further questions.      Please Contact your PCP clinic or return to the Emergency department if your:    Symptoms return.    Symptoms worsen or other concerning symptom's.    PCP follow-up Questions asked: YES       [RN Name]  Flaca Davila RN  [employer] RN  Lung Nodule and ED Lab Result RN  Epic pool (ED late result f/u RN): [id number]  [facility] INCIDENTAL RADIOLOGY F/U NURSES: [id number]  # [phone]

## 2021-09-19 ENCOUNTER — HEALTH MAINTENANCE LETTER (OUTPATIENT)
Age: 36
End: 2021-09-19

## 2022-01-09 ENCOUNTER — HEALTH MAINTENANCE LETTER (OUTPATIENT)
Age: 37
End: 2022-01-09

## 2022-11-12 ENCOUNTER — WALK IN (OUTPATIENT)
Dept: URGENT CARE | Age: 37
End: 2022-11-12

## 2022-11-12 VITALS — HEART RATE: 74 BPM | OXYGEN SATURATION: 99 % | TEMPERATURE: 97.7 F

## 2022-11-12 DIAGNOSIS — H10.33 ACUTE BACTERIAL CONJUNCTIVITIS OF BOTH EYES: Primary | ICD-10-CM

## 2022-11-12 PROCEDURE — 99213 OFFICE O/P EST LOW 20 MIN: CPT | Performed by: PHYSICIAN ASSISTANT

## 2022-11-12 RX ORDER — MOXIFLOXACIN 5 MG/ML
1 SOLUTION/ DROPS OPHTHALMIC 3 TIMES DAILY
Qty: 10 ML | Refills: 0 | Status: SHIPPED | OUTPATIENT
Start: 2022-11-12 | End: 2022-11-19

## 2022-11-21 ENCOUNTER — HEALTH MAINTENANCE LETTER (OUTPATIENT)
Age: 37
End: 2022-11-21

## 2023-04-16 ENCOUNTER — HEALTH MAINTENANCE LETTER (OUTPATIENT)
Age: 38
End: 2023-04-16